# Patient Record
Sex: FEMALE | Race: BLACK OR AFRICAN AMERICAN
[De-identification: names, ages, dates, MRNs, and addresses within clinical notes are randomized per-mention and may not be internally consistent; named-entity substitution may affect disease eponyms.]

---

## 2018-07-11 ENCOUNTER — HOSPITAL ENCOUNTER (EMERGENCY)
Dept: HOSPITAL 5 - ED | Age: 33
LOS: 1 days | Discharge: HOME | End: 2018-07-12
Payer: SELF-PAY

## 2018-07-11 DIAGNOSIS — Z90.49: ICD-10-CM

## 2018-07-11 DIAGNOSIS — M79.1: ICD-10-CM

## 2018-07-11 DIAGNOSIS — R07.9: ICD-10-CM

## 2018-07-11 DIAGNOSIS — M32.9: Primary | ICD-10-CM

## 2018-07-11 PROCEDURE — 71046 X-RAY EXAM CHEST 2 VIEWS: CPT

## 2018-07-11 PROCEDURE — 86140 C-REACTIVE PROTEIN: CPT

## 2018-07-11 PROCEDURE — 81001 URINALYSIS AUTO W/SCOPE: CPT

## 2018-07-11 PROCEDURE — 93005 ELECTROCARDIOGRAM TRACING: CPT

## 2018-07-11 PROCEDURE — 36415 COLL VENOUS BLD VENIPUNCTURE: CPT

## 2018-07-11 PROCEDURE — 99284 EMERGENCY DEPT VISIT MOD MDM: CPT

## 2018-07-11 PROCEDURE — 93010 ELECTROCARDIOGRAM REPORT: CPT

## 2018-07-11 PROCEDURE — 81025 URINE PREGNANCY TEST: CPT

## 2018-07-11 PROCEDURE — 85652 RBC SED RATE AUTOMATED: CPT

## 2018-07-11 PROCEDURE — 85025 COMPLETE CBC W/AUTO DIFF WBC: CPT

## 2018-07-11 PROCEDURE — 80053 COMPREHEN METABOLIC PANEL: CPT

## 2018-07-12 VITALS — DIASTOLIC BLOOD PRESSURE: 67 MMHG | SYSTOLIC BLOOD PRESSURE: 101 MMHG

## 2018-07-12 LAB
ALBUMIN SERPL-MCNC: 4.3 G/DL (ref 3.9–5)
ALT SERPL-CCNC: 25 UNITS/L (ref 7–56)
BASOPHILS # (AUTO): 0.1 K/MM3 (ref 0–0.1)
BASOPHILS NFR BLD AUTO: 0.8 % (ref 0–1.8)
BILIRUB UR QL STRIP: (no result)
BLOOD UR QL VISUAL: (no result)
BUN SERPL-MCNC: 10 MG/DL (ref 7–17)
BUN/CREAT SERPL: 17 %
CALCIUM SERPL-MCNC: 9.4 MG/DL (ref 8.4–10.2)
CAOX CRY #/AREA URNS HPF: (no result) /[HPF]
EOSINOPHIL # BLD AUTO: 0.2 K/MM3 (ref 0–0.4)
EOSINOPHIL NFR BLD AUTO: 1.9 % (ref 0–4.3)
GRAN CASTS #/AREA URNS LPF: 3 /LPF
HCT VFR BLD CALC: 35.6 % (ref 30.3–42.9)
HEMOLYSIS INDEX: 4
HGB BLD-MCNC: 13 GM/DL (ref 10.1–14.3)
HGB S BLD QL SOLY: (no result)
LYMPHOCYTES # BLD AUTO: 2.6 K/MM3 (ref 1.2–5.4)
LYMPHOCYTES NFR BLD AUTO: 25.4 % (ref 13.4–35)
MCH RBC QN AUTO: 36 PG (ref 28–32)
MCHC RBC AUTO-ENTMCNC: 37 % (ref 30–34)
MCV RBC AUTO: 97 FL (ref 79–97)
MONOCYTES # (AUTO): 1.1 K/MM3 (ref 0–0.8)
MONOCYTES % (AUTO): 10.6 % (ref 0–7.3)
MUCOUS THREADS #/AREA URNS HPF: (no result) /HPF
PH UR STRIP: 6 [PH] (ref 5–7)
PLATELET # BLD: 305 K/MM3 (ref 140–440)
PROT UR STRIP-MCNC: (no result) MG/DL
RBC # BLD AUTO: 3.65 M/MM3 (ref 3.65–5.03)
RBC #/AREA URNS HPF: 6 /HPF (ref 0–6)
UROBILINOGEN UR-MCNC: 2 MG/DL (ref ?–2)
WBC #/AREA URNS HPF: < 1 /HPF (ref 0–6)

## 2018-07-12 NOTE — EMERGENCY DEPARTMENT REPORT
ED General Adult HPI





- General


Chief complaint: Pain General


Stated complaint: LUPUS PAIN


Time Seen by Provider: 07/12/18 00:36


Source: patient


Mode of arrival: Ambulatory


Limitations: No Limitations





- History of Present Illness


Initial comments: 


 Patient has a history of lupus.  She says for the past 2-3 weeks she's been 

having a lupus flare where she is having pain in all her joints.  This is 

similar to her past flares.  Her last flare was a month ago.  Patient states 

she ran out of her plaque when ill 2 weeks ago.  She has been following up with 

the [place] [facility] for a refill, but the prescription has not 

arrived.  She is endorsing mild left chest pain that is pleuritic, nonexertional

, and nonradiating.  No shortness of breath.  She says she normally has chest 

pain with her lupus flares.  She is also endorsing mild left abdominal pain for 

the past 2 days that is not affected by food and associated with urinary 

frequency.  Afebrile.  Patient is currently taking 5 mg prednisone daily as 

part of her routine this regimen.


Nonsmoker.  Denies alcohol use.  No history of drugs or family history of ACS.


Severity scale (0 -10): 9





- Related Data


 Previous Rx's











 Medication  Instructions  Recorded  Last Taken  Type


 


Hydroxychloroquine [Plaquenil] 250 mg PO QDAY #10 tablet 07/12/18 Unknown Rx


 


Prednisone 5 mg PO DAILY #10 tablet 07/12/18 Unknown Rx











 Allergies











Allergy/AdvReac Type Severity Reaction Status Date / Time


 


No Known Allergies Allergy   Verified 07/11/18 23:42














ED Review of Systems


ROS: 


Stated complaint: LUPUS PAIN


Other details as noted in HPI





Comment: All other systems reviewed and negative


Cardiovascular: chest pain


Gastrointestinal: abdominal pain


Genitourinary: frequency


Musculoskeletal: arthralgia





ED Past Medical Hx





- Past Medical History


Additional medical history: SLE





- Surgical History


Hx Cholecystectomy: Yes





- Social History


Smoking Status: Never Smoker


Substance Use Type: None





- Medications


Home Medications: 


 Home Medications











 Medication  Instructions  Recorded  Confirmed  Last Taken  Type


 


Hydroxychloroquine [Plaquenil] 250 mg PO QDAY #10 tablet 07/12/18  Unknown Rx


 


Prednisone 5 mg PO DAILY #10 tablet 07/12/18  Unknown Rx














ED Physical Exam





- General


Limitations: No Limitations


General appearance: alert, in no apparent distress





- Head


Head exam: Present: atraumatic, normocephalic





- Eye


Eye exam: Present: normal appearance





- ENT


ENT exam: Present: mucous membranes moist





- Neck


Neck exam: Present: normal inspection





- Respiratory


Respiratory exam: Present: normal lung sounds bilaterally.  Absent: respiratory 

distress





- Cardiovascular


Cardiovascular Exam: Present: regular rate, normal rhythm.  Absent: systolic 

murmur, diastolic murmur, rubs, gallop





- GI/Abdominal


GI/Abdominal exam: Present: soft, tenderness (left flank, no cva), normal bowel 

sounds.  Absent: guarding, rebound, rigid





- Extremities Exam


Extremities exam: Present: normal inspection





- Back Exam


Back exam: Present: normal inspection





- Neurological Exam


Neurological exam: Present: alert, oriented X3





- Psychiatric


Psychiatric exam: Present: normal affect, normal mood





- Skin


Skin exam: Present: warm, dry, intact, normal color.  Absent: rash





ED Course


 Vital Signs











  07/11/18 07/12/18 07/12/18





  23:42 00:30 02:20


 


Temperature 98.8 F 98 F 


 


Pulse Rate 85 87 


 


Respiratory 18 15 16





Rate   


 


Blood Pressure 108/56  


 


Blood Pressure  110/55 





[Left]   


 


O2 Sat by Pulse 100 100 





Oximetry   














  07/12/18 07/12/18





  02:21 02:30


 


Temperature  


 


Pulse Rate  84


 


Respiratory 16 12





Rate  


 


Blood Pressure  101/67


 


Blood Pressure  





[Left]  


 


O2 Sat by Pulse  100





Oximetry  














ED Medical Decision Making





- Lab Data


Result diagrams: 


 07/11/18 23:48





 07/11/18 23:48





- EKG Data


-: EKG Interpreted by Me


EKG shows normal: sinus rhythm, axis, intervals, QRS complexes, ST-T waves


Rate: normal





- EKG Data


Interpretation: no acute changes





- Radiology Data


Radiology results: report reviewed, image reviewed





- Medical Decision Making


 33-year-old female with past medical history of lupus on daily prednisone that 

presents to the ER with concern for lupus flare.  EKG is nonischemic.  Lab work 

is unremarkable.  Chest x-ray shows no focal process.  I have a low Suspicion 

for PE given unremarkable vitals/EKG and that her symptoms are similar to her 

past lupus flares.  Presentation appears consistent with a lupus flare.  

Patient has been given a prescription for 10 days of plaquenil and prednisone.  

I have instructed her to follow up with the rheumatology clinic for further 

refills of her prescription medication.








- Differential Diagnosis


ACS, PE, pneumonia, lupus flare, viral syndrome, sepsis


Critical care attestation.: 


If time is entered above; I have spent that time in minutes in the direct care 

of this critically ill patient, excluding procedure time.








ED Disposition


Clinical Impression: 


 Lupus (systemic lupus erythematosus), Chest pain, Arthralgia





Disposition: DC-01 TO HOME OR SELFCARE


Is pt being admited?: No


Does the pt Need Aspirin: No


Condition: Stable


Instructions:  Chest Pain (ED)


Additional Instructions: 


Please follow up with the rheumatology clinic to get refills of your plaquenil 

and prednisone prescriptions.


Prescriptions: 


Hydroxychloroquine [Plaquenil] 250 mg PO QDAY #10 tablet


Prednisone 5 mg PO DAILY #10 tablet

## 2018-07-12 NOTE — XRAY REPORT
FINAL REPORT



EXAM:  XR CHEST ROUTINE 2V



HISTORY:  chest pain 



COMPARISON:  None available. 



FINDINGS::  Frontal and lateral views of the chest obtained.

Cardiac silhouette is within normal limits. No focal

consolidation or effusion. No pneumothorax. Visualized bony

thorax is grossly intact.



IMPRESSION::  No acute findings.